# Patient Record
Sex: FEMALE | Race: WHITE | Employment: FULL TIME | URBAN - METROPOLITAN AREA
[De-identification: names, ages, dates, MRNs, and addresses within clinical notes are randomized per-mention and may not be internally consistent; named-entity substitution may affect disease eponyms.]

---

## 2018-01-03 ENCOUNTER — HOSPITAL ENCOUNTER (OUTPATIENT)
Dept: MAMMOGRAPHY | Age: 53
Discharge: HOME | End: 2018-01-03
Attending: FAMILY MEDICINE | Admitting: FAMILY MEDICINE

## 2018-01-03 DIAGNOSIS — Z12.31 ENCOUNTER FOR SCREENING MAMMOGRAM FOR BREAST CANCER: ICD-10-CM

## 2018-09-05 ENCOUNTER — TELEPHONE (OUTPATIENT)
Dept: FAMILY MEDICINE CLINIC | Age: 53
End: 2018-09-05

## 2018-09-18 ENCOUNTER — OFFICE VISIT (OUTPATIENT)
Dept: FAMILY MEDICINE CLINIC | Age: 53
End: 2018-09-18

## 2018-09-18 VITALS
DIASTOLIC BLOOD PRESSURE: 58 MMHG | WEIGHT: 150.2 LBS | HEART RATE: 74 BPM | OXYGEN SATURATION: 98 % | SYSTOLIC BLOOD PRESSURE: 118 MMHG | HEIGHT: 69 IN | BODY MASS INDEX: 22.25 KG/M2

## 2018-09-18 DIAGNOSIS — Z11.59 ENCOUNTER FOR HEPATITIS C SCREENING TEST FOR LOW RISK PATIENT: ICD-10-CM

## 2018-09-18 DIAGNOSIS — Z00.00 ANNUAL PHYSICAL EXAM: Primary | ICD-10-CM

## 2018-09-18 PROCEDURE — 99386 PREV VISIT NEW AGE 40-64: CPT | Performed by: FAMILY MEDICINE

## 2018-09-18 ASSESSMENT — ENCOUNTER SYMPTOMS
SINUS PRESSURE: 0
RHINORRHEA: 0
EYE PAIN: 0
EYE DISCHARGE: 0
CONSTIPATION: 0
SHORTNESS OF BREATH: 0
NAUSEA: 0
DIARRHEA: 0
VOMITING: 0
COLOR CHANGE: 0
COUGH: 0
CHEST TIGHTNESS: 0
EYE REDNESS: 0
ABDOMINAL PAIN: 0
WHEEZING: 0
BLOOD IN STOOL: 0

## 2018-09-18 ASSESSMENT — PATIENT HEALTH QUESTIONNAIRE - PHQ9
SUM OF ALL RESPONSES TO PHQ9 QUESTIONS 1 & 2: 0
SUM OF ALL RESPONSES TO PHQ QUESTIONS 1-9: 0
SUM OF ALL RESPONSES TO PHQ QUESTIONS 1-9: 0
2. FEELING DOWN, DEPRESSED OR HOPELESS: 0
1. LITTLE INTEREST OR PLEASURE IN DOING THINGS: 0

## 2018-12-26 ENCOUNTER — TELEPHONE (OUTPATIENT)
Dept: FAMILY MEDICINE CLINIC | Age: 53
End: 2018-12-26

## 2018-12-26 NOTE — TELEPHONE ENCOUNTER
Left message for patient to call back. We can give her the list of allergists but if they require a referral she still may need an office visit with DR. Dietrich. [address] [facility] [address]                  [place], [address]                   or                  [facility] [address]                  [address]    Dr. Jes Alvarenga    [address], [place] [address] ?                  [facility] [address]? [address]?                    [facility] 30 [phone]

## 2018-12-26 NOTE — TELEPHONE ENCOUNTER
Patient is calling to see who you would recommend for her allergist?  She states she has her normal fall like symptoms:  Burning eyes, runny nose. She also has gords in her basement to dry out and thinks that might be the problem. I advised she may need an appointment for a referral for insurance. She declined an appointment.

## 2019-06-01 ENCOUNTER — HOSPITAL ENCOUNTER (OUTPATIENT)
Dept: WOMENS IMAGING | Age: 54
Discharge: HOME | End: 2019-06-01
Payer: COMMERCIAL

## 2019-06-01 DIAGNOSIS — Z12.39 BREAST CANCER SCREENING: ICD-10-CM

## 2019-06-01 PROCEDURE — 77063 BREAST TOMOSYNTHESIS BI: CPT

## 2019-06-21 ENCOUNTER — OFFICE VISIT (OUTPATIENT)
Dept: FAMILY MEDICINE CLINIC | Age: 54
End: 2019-06-21
Payer: COMMERCIAL

## 2019-06-21 VITALS
TEMPERATURE: 97.5 F | WEIGHT: 153 LBS | BODY MASS INDEX: 22.66 KG/M2 | OXYGEN SATURATION: 99 % | DIASTOLIC BLOOD PRESSURE: 60 MMHG | HEART RATE: 60 BPM | SYSTOLIC BLOOD PRESSURE: 122 MMHG | HEIGHT: 69 IN
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
## 2019-06-21 DIAGNOSIS — R53.83 FATIGUE, UNSPECIFIED TYPE: ICD-10-CM

## 2019-06-21 DIAGNOSIS — M25.50 ARTHRALGIA, UNSPECIFIED JOINT: Primary | ICD-10-CM

## 2019-06-21 DIAGNOSIS — R68.83 CHILLS: ICD-10-CM

## 2019-06-21 LAB
A/G RATIO: 1.7 (ref 1.1–2.2)
ALBUMIN SERPL-MCNC: 4 G/DL (ref 3.4–5)
ALP BLD-CCNC: 56 U/L (ref 40–129)
ALT SERPL-CCNC: 26 U/L (ref 10–40)
ANION GAP SERPL CALCULATED.3IONS-SCNC: 10 MMOL/L (ref 3–16)
AST SERPL-CCNC: 30 U/L (ref 15–37)
BASOPHILS ABSOLUTE: 0 K/UL (ref 0–0.2)
BASOPHILS RELATIVE PERCENT: 0.4 %
BILIRUB SERPL-MCNC: <0.2 MG/DL (ref 0–1)
BUN BLDV-MCNC: 8 MG/DL (ref 7–20)
CALCIUM SERPL-MCNC: 8.6 MG/DL (ref 8.3–10.6)
CHLORIDE BLD-SCNC: 98 MMOL/L (ref 99–110)
CO2: 26 MMOL/L (ref 21–32)
CREAT SERPL-MCNC: 0.8 MG/DL (ref 0.6–1.1)
EOSINOPHILS ABSOLUTE: 0.1 K/UL (ref 0–0.6)
EOSINOPHILS RELATIVE PERCENT: 1.6 %
GFR AFRICAN AMERICAN: >60
GFR NON-AFRICAN AMERICAN: >60
GLOBULIN: 2.3 G/DL
GLUCOSE BLD-MCNC: 90 MG/DL (ref 70–99)
HCT VFR BLD CALC: 37.3 % (ref 36–48)
HEMOGLOBIN: 12.3 G/DL (ref 12–16)
LYMPHOCYTES ABSOLUTE: 1 K/UL (ref 1–5.1)
LYMPHOCYTES RELATIVE PERCENT: 21.9 %
MCH RBC QN AUTO: 30.1 PG (ref 26–34)
MCHC RBC AUTO-ENTMCNC: 33 G/DL (ref 31–36)
MCV RBC AUTO: 91.3 FL (ref 80–100)
MONOCYTES ABSOLUTE: 0.2 K/UL (ref 0–1.3)
MONOCYTES RELATIVE PERCENT: 5.1 %
NEUTROPHILS ABSOLUTE: 3.4 K/UL (ref 1.7–7.7)
NEUTROPHILS RELATIVE PERCENT: 71 %
PDW BLD-RTO: 13.2 % (ref 12.4–15.4)
PLATELET # BLD: 175 K/UL (ref 135–450)
PMV BLD AUTO: 10 FL (ref 5–10.5)
POTASSIUM SERPL-SCNC: 5 MMOL/L (ref 3.5–5.1)
RBC # BLD: 4.09 M/UL (ref 4–5.2)
SEDIMENTATION RATE, ERYTHROCYTE: 16 MM/HR (ref 0–30)
SODIUM BLD-SCNC: 134 MMOL/L (ref 136–145)
TOTAL PROTEIN: 6.3 G/DL (ref 6.4–8.2)
TSH REFLEX: 2.06 UIU/ML (ref 0.27–4.2)
WBC # BLD: 4.8 K/UL (ref 4–11)

## 2019-06-21 PROCEDURE — 99213 OFFICE O/P EST LOW 20 MIN: CPT | Performed by: FAMILY MEDICINE

## 2019-06-21 ASSESSMENT — PATIENT HEALTH QUESTIONNAIRE - PHQ9
SUM OF ALL RESPONSES TO PHQ QUESTIONS 1-9: 0
2. FEELING DOWN, DEPRESSED OR HOPELESS: 0
SUM OF ALL RESPONSES TO PHQ9 QUESTIONS 1 & 2: 0
1. LITTLE INTEREST OR PLEASURE IN DOING THINGS: 0
SUM OF ALL RESPONSES TO PHQ QUESTIONS 1-9: 0

## 2019-06-21 ASSESSMENT — ENCOUNTER SYMPTOMS
VOMITING: 0
NAUSEA: 0
EYE DISCHARGE: 0
WHEEZING: 0
SHORTNESS OF BREATH: 0
SINUS PRESSURE: 0
RHINORRHEA: 0
DIARRHEA: 0
COUGH: 0
BLOOD IN STOOL: 0
EYE PAIN: 0
EYE REDNESS: 0
ABDOMINAL PAIN: 0
CHEST TIGHTNESS: 0
CONSTIPATION: 0

## 2019-06-21 NOTE — PROGRESS NOTES
rebound and no guarding. Lymphadenopathy:     She has no cervical adenopathy. Neurological: She is alert and oriented to person, place, and time. She displays normal reflexes. No cranial nerve deficit. Coordination normal.   Skin: Skin is warm. No rash noted. No erythema. No pallor. No target lesion seen   Psychiatric: She has a normal mood and affect. Her behavior is normal. Thought content normal.       Assessment:      Arthralgia- acute, ?  Viral vs tick related  Chills  Tick bite- possible exposure to lyme      Plan:      Orders Placed This Encounter   Procedures    CBC WITH AUTO DIFFERENTIAL    SEDIMENTATION RATE    BEVERLY    TSH with Reflex    COMPREHENSIVE METABOLIC PANEL    Lyme Disease AB Total W Rflx to IgG/ IgM             JACKIE Lino [address], DO

## 2019-06-22 LAB — ANTI-NUCLEAR ANTIBODY (ANA): NEGATIVE

## 2019-06-22 NOTE — PATIENT INSTRUCTIONS

## 2019-06-24 LAB — LYME, EIA: 0.46 LIV (ref 0–1.2)

## 2021-06-23 ENCOUNTER — OFFICE VISIT (OUTPATIENT)
Dept: ENT CLINIC | Age: 56
End: 2021-06-23
Payer: COMMERCIAL

## 2021-06-23 VITALS
DIASTOLIC BLOOD PRESSURE: 61 MMHG | SYSTOLIC BLOOD PRESSURE: 100 MMHG | HEIGHT: 69 IN | HEART RATE: 68 BPM | TEMPERATURE: 97.2 F | BODY MASS INDEX: 22.66 KG/M2 | WEIGHT: 153 LBS

## 2021-06-23 DIAGNOSIS — H93.13 TINNITUS AURIUM, BILATERAL: Primary | ICD-10-CM

## 2021-06-23 DIAGNOSIS — H61.21 EXCESSIVE CERUMEN IN EAR CANAL, RIGHT: ICD-10-CM

## 2021-06-23 PROCEDURE — 99203 OFFICE O/P NEW LOW 30 MIN: CPT | Performed by: OTOLARYNGOLOGY

## 2021-06-23 NOTE — PROGRESS NOTES
file.     No current facility-administered medications for this visit.        Review of Systems     REVIEW OF SYSTEMS  The following systems were reviewed and revealed the following in addition to any already discussed in the HPI:    CONSTITUTIONAL: No weight loss, no fever, no night sweats, no chills  EYES: no vision changes, no blurry vision  EARS: no changes in hearing, no otalgia  NOSE: no epistaxis, no rhinorrhea  RESPIRATORY: No difficulty breathing, no shortness of breath  CV: no chest pain, no peripheral vascular disease  HEME: No coagulation disorder, no bleeding disorder  NEURO: No TIA or stroke-like symptoms  SKIN: No new rashes in the head and neck, no recent skin cancers  MOUTH: No no new ulcers, no recent teeth infections  GASTROINTESTINAL: No diarrhea, stomach pain  PSYCH: No anxiety, no depression    PhysicalExam     Vitals:    06/23/21 0912   BP: 100/61   Site: Left Upper Arm   Position: Sitting   Pulse: 68   Temp: 97.2 °F (36.2 °C)   Weight: 153 lb (69.4 kg)   Height: 5' 9\" (1.753 m)       PHYSICAL EXAM  /61 (Site: Left Upper Arm, Position: Sitting)   Pulse 68   Temp 97.2 °F (36.2 °C)   Ht 5' 9\" (1.753 m)   Wt 153 lb (69.4 kg)   LMP 10/28/2015 (Approximate)   BMI 22.59 kg/m²     GENERAL: No Acute Distress, Alert and Oriented, no hoarseness  EYES: EOMI, Anti-icteric  NOSE: On anterior rhinoscopy there is no epistaxis, nasal mucosa within normal limits, no purulent drainage  EARS: Normal external appearance; on portable otomicroscopy:  -Right ear: External auditory canal with nonoccluding cerumen, removed with suction, tympanic membrane clear, no middle ear effusions or retractions  -Left ear: External auditory canal without stenosis, tympanic membrane clear, no middle ear effusions or retractions  -Tuning fork testing: With a 512-Hz tuning fork the Lina[place] is midline, The Rinne on the right ear the is air>bone conduction, on the left ear air>bone conduction  FACE: 1/6 House-Brackmann Scale, symmetric, sensation equal bilaterally  ORAL CAVITY: No masses or lesions palpated, uvula is midline, moist mucous membranes, 0+ tonsils, absent uvula, good dentition  NECK: Normal range of motion, no thyromegaly, trachea is midline, no lymphadenopathy, no neck masses, no crepitus  CHEST: Normal respiratory effort, no retractions, breathing comfortably  SKIN: No rashes, normal appearing skin, no evidence of skin lesions/tumors  NEURO: CN 2, 3, 4, 5, 6, 7, 11, 12 intact bilaterally     Data/Imaging Review       Procedure     None      Assessment and Plan     1. Tinnitus aurium, bilateral  Bilateral tinnitus for the past 3-4 years with no subjective hearing loss; tonal, constant, nonpulsatile in quality. Nonintrusive. We discussed methods for tinnitus management including stress relief, masking; we offered audiometric testing which the patient declined at this time-should hearing worsen, or tinnitus become intrusive we would recommend an audiogram.  She may follow-up with us on an as-needed basis. 2. Excessive cerumen in ear canal, right  Patient is a Q-tip user-she has significant but not impacted cerumen in the right ear which was removed with suction. We counseled her on the use of hydroperoxide/mineral oil for management of cerumen, advised her not to use Q-tips in the ears. We will see her back for this on an as-needed basis. Return if symptoms worsen or fail to improve. Nile Llamas MD  [address] [place], [place] [address]  Department of Otolaryngology/Head and Neck Surgery  6/23/21    I have performed a head and neck physical exam personally or was physically present during the key or critical portions of the service. Medical Decision Making:   The following items were considered in medical decision making:  Independent review of images  Review / order clinical lab tests  Review / order radiology tests  Decision to obtain old records  Review and summation of old records as accessed through [facility] (a summary of my findings in these old records: None)     Portions of this note were dictated using Dragon.  There may be linguistic errors secondary to the use of this program.

## 2021-06-23 NOTE — PATIENT INSTRUCTIONS
Ear hygiene instructions:  -Do not use q-tips or helen pins to clean out ears  -Do not place fingers in ear canals  -If ears feel occluded by wax, may use hydrogen peroxide (10 drops in each ear every 2 weeks, lie with ear upright for 10-15 minutes after placing hydrogen peroxide drops) or mineral oil (4 drops every 2 weeks) to clean ear canals

## 2021-08-03 ENCOUNTER — HOSPITAL ENCOUNTER (OUTPATIENT)
Dept: WOMENS IMAGING | Age: 56
Discharge: HOME | End: 2021-08-03
Payer: COMMERCIAL

## 2021-08-03 VITALS — WEIGHT: 150 LBS | HEIGHT: 69 IN | BODY MASS INDEX: 22.22 KG/M2

## 2021-08-03 DIAGNOSIS — Z12.31 ENCOUNTER FOR SCREENING MAMMOGRAM FOR BREAST CANCER: ICD-10-CM

## 2021-08-03 PROCEDURE — 77063 BREAST TOMOSYNTHESIS BI: CPT

## 2021-08-12 ENCOUNTER — OFFICE VISIT (OUTPATIENT)
Dept: FAMILY MEDICINE CLINIC | Age: 56
End: 2021-08-12
Payer: COMMERCIAL

## 2021-08-12 ENCOUNTER — TELEPHONE (OUTPATIENT)
Dept: PULMONOLOGY | Age: 56
End: 2021-08-12

## 2021-08-12 VITALS
SYSTOLIC BLOOD PRESSURE: 118 MMHG | HEIGHT: 69 IN | HEART RATE: 67 BPM | OXYGEN SATURATION: 98 % | WEIGHT: 150.8 LBS | DIASTOLIC BLOOD PRESSURE: 72 MMHG | BODY MASS INDEX: 22.33 KG/M2

## 2021-08-12 DIAGNOSIS — Z00.00 ANNUAL PHYSICAL EXAM: Primary | ICD-10-CM

## 2021-08-12 DIAGNOSIS — R06.83 SNORING: ICD-10-CM

## 2021-08-12 DIAGNOSIS — Z11.4 ENCOUNTER FOR SCREENING FOR HUMAN IMMUNODEFICIENCY VIRUS (HIV): ICD-10-CM

## 2021-08-12 DIAGNOSIS — Z23 NEED FOR SHINGLES VACCINE: ICD-10-CM

## 2021-08-12 DIAGNOSIS — R53.82 CHRONIC FATIGUE: ICD-10-CM

## 2021-08-12 DIAGNOSIS — Z11.59 ENCOUNTER FOR HEPATITIS C SCREENING TEST FOR LOW RISK PATIENT: ICD-10-CM

## 2021-08-12 LAB
A/G RATIO: 1.8 (ref 1.1–2.2)
ALBUMIN SERPL-MCNC: 4.4 G/DL (ref 3.4–5)
ALP BLD-CCNC: 50 U/L (ref 40–129)
ALT SERPL-CCNC: 22 U/L (ref 10–40)
ANION GAP SERPL CALCULATED.3IONS-SCNC: 12 MMOL/L (ref 3–16)
AST SERPL-CCNC: 27 U/L (ref 15–37)
BILIRUB SERPL-MCNC: <0.2 MG/DL (ref 0–1)
BUN BLDV-MCNC: 15 MG/DL (ref 7–20)
CALCIUM SERPL-MCNC: 8.9 MG/DL (ref 8.3–10.6)
CHLORIDE BLD-SCNC: 102 MMOL/L (ref 99–110)
CHOLESTEROL, TOTAL: 224 MG/DL (ref 0–199)
CO2: 27 MMOL/L (ref 21–32)
CREAT SERPL-MCNC: 0.6 MG/DL (ref 0.6–1.1)
GFR AFRICAN AMERICAN: >60
GFR NON-AFRICAN AMERICAN: >60
GLOBULIN: 2.4 G/DL
GLUCOSE BLD-MCNC: 88 MG/DL (ref 70–99)
HCT VFR BLD CALC: 37.1 % (ref 36–48)
HDLC SERPL-MCNC: 75 MG/DL (ref 40–60)
HEMOGLOBIN: 12.4 G/DL (ref 12–16)
HEPATITIS C ANTIBODY INTERPRETATION: NORMAL
LDL CHOLESTEROL CALCULATED: 134 MG/DL
MCH RBC QN AUTO: 30.3 PG (ref 26–34)
MCHC RBC AUTO-ENTMCNC: 33.3 G/DL (ref 31–36)
MCV RBC AUTO: 90.9 FL (ref 80–100)
PDW BLD-RTO: 13.9 % (ref 12.4–15.4)
PLATELET # BLD: 251 K/UL (ref 135–450)
PMV BLD AUTO: 10.1 FL (ref 5–10.5)
POTASSIUM SERPL-SCNC: 4.5 MMOL/L (ref 3.5–5.1)
RBC # BLD: 4.08 M/UL (ref 4–5.2)
SODIUM BLD-SCNC: 141 MMOL/L (ref 136–145)
TOTAL PROTEIN: 6.8 G/DL (ref 6.4–8.2)
TRIGL SERPL-MCNC: 76 MG/DL (ref 0–150)
TSH REFLEX: 1.08 UIU/ML (ref 0.27–4.2)
VLDLC SERPL CALC-MCNC: 15 MG/DL
WBC # BLD: 5.8 K/UL (ref 4–11)

## 2021-08-12 PROCEDURE — 36415 COLL VENOUS BLD VENIPUNCTURE: CPT | Performed by: FAMILY MEDICINE

## 2021-08-12 PROCEDURE — 90750 HZV VACC RECOMBINANT IM: CPT | Performed by: FAMILY MEDICINE

## 2021-08-12 PROCEDURE — 90471 IMMUNIZATION ADMIN: CPT | Performed by: FAMILY MEDICINE

## 2021-08-12 PROCEDURE — 99396 PREV VISIT EST AGE 40-64: CPT | Performed by: FAMILY MEDICINE

## 2021-08-12 ASSESSMENT — ENCOUNTER SYMPTOMS
EYE PAIN: 0
BLOOD IN STOOL: 0
COUGH: 0
EYE DISCHARGE: 0
EYE REDNESS: 0
CONSTIPATION: 0
COLOR CHANGE: 0
NAUSEA: 0
DIARRHEA: 0
WHEEZING: 0
CHEST TIGHTNESS: 0
SINUS PRESSURE: 0
RHINORRHEA: 0
ABDOMINAL PAIN: 0
SHORTNESS OF BREATH: 0
VOMITING: 0

## 2021-08-12 ASSESSMENT — PATIENT HEALTH QUESTIONNAIRE - PHQ9
SUM OF ALL RESPONSES TO PHQ QUESTIONS 1-9: 0
2. FEELING DOWN, DEPRESSED OR HOPELESS: 0
SUM OF ALL RESPONSES TO PHQ9 QUESTIONS 1 & 2: 0
1. LITTLE INTEREST OR PLEASURE IN DOING THINGS: 0

## 2021-08-12 NOTE — PROGRESS NOTES
Subjective:      Patient ID: Yolanda Hearn is a 54 y.o. female. HPI  Chief Complaint   Patient presents with    Annual Exam     Pt is here for physical. Pt states she did have 3 glasses of wine last night. Here for CPE. Nonsmoker. [facility] dental and vision  Sees Dr. Montgomery- annual visit  Has chronic fatigue even though very active  States does snore at nite   on CPAP    Yolanda Hearn is a 54 y.o. female with the following history as recorded in [facility]:  Patient Active Problem List    Diagnosis Date Noted    Menopause 2015    Radiculopathy affecting upper extremity 2014    Left arm numbness 2014    Neck pain 2014    Melanoma ([facility] [address]      No current outpatient medications on file. No current facility-administered medications for this visit. Allergies: Patient has no known allergies. Past Medical History:   Diagnosis Date    Melanoma ([facility] [address]     twice, 10 years ago    Radiculopathy affecting upper extremity 2014     Past Surgical History:   Procedure Laterality Date    COLONOSCOPY  16    PLANTAR FASCIA SURGERY  2014    UTERINE FIBROID SURGERY       Family History   Problem Relation Age of Onset    Parkinsonism Father     Cancer Maternal Grandmother 54        breast    Breast Cancer Maternal Grandmother     Cancer Paternal Grandmother 79        breast    Breast Cancer Paternal Grandmother      Social History     Tobacco Use    Smoking status: Former Smoker     Packs/day: 0.25     Years: 5.00     Pack years: 1.25     Types: Cigarettes     Quit date: 1988     Years since quittin.9    Smokeless tobacco: Never Used   Substance Use Topics    Alcohol use:  Yes     Alcohol/week: 1.0 standard drinks     Types: 1 Glasses of wine per week     Comment: 3-4 days per week     Vitals:    21 0829   BP: 118/72   Site: Right Upper Arm   Position: Sitting   Pulse: 67   SpO2: 98%   Weight: 150 lb 12.8 oz (68.4 kg)   Height: 5' 9\" (1.753 m)     Body mass index is 22.27 kg/m². Wt Readings from Last 3 Encounters:   08/12/21 150 lb 12.8 oz (68.4 kg)   08/03/21 150 lb (68 kg)   06/23/21 153 lb (69.4 kg)     BP Readings from Last 3 Encounters:   08/12/21 118/72   06/23/21 100/61   06/21/19 122/60        Review of Systems   Constitutional: Negative for chills, fatigue, fever and unexpected weight change. HENT: Negative for ear discharge, ear pain, hearing loss, rhinorrhea, sinus pressure and tinnitus. Eyes: Negative for pain, discharge, redness and visual disturbance. Respiratory: Negative for cough, chest tightness, shortness of breath and wheezing. Cardiovascular: Negative for chest pain and palpitations. Gastrointestinal: Negative for abdominal pain, blood in stool, constipation, diarrhea, nausea and vomiting. Genitourinary: Negative for difficulty urinating, dysuria and hematuria. Musculoskeletal: Negative for arthralgias and myalgias. Skin: Negative for color change and rash. Neurological: Negative for dizziness, seizures, syncope and headaches. Hematological: Negative for adenopathy. Does not bruise/bleed easily. Psychiatric/Behavioral: Negative for dysphoric mood and sleep disturbance. The patient is not nervous/anxious. Objective:   Physical Exam  Constitutional:       General: She is not in acute distress. Appearance: Normal appearance. She is well-developed and normal weight. She is not ill-appearing. HENT:      Head: Normocephalic. Right Ear: Tympanic membrane and external ear normal.      Left Ear: Tympanic membrane and external ear normal.      Nose: Nose normal.      Mouth/Throat:      Mouth: Mucous membranes are moist.      Pharynx: Oropharynx is clear. No oropharyngeal exudate. Eyes:      General: No scleral icterus. Conjunctiva/sclera: Conjunctivae normal.      Pupils: Pupils are equal, round, and reactive to light. Neck:      Thyroid: No thyromegaly.    Cardiovascular:      Rate and Rhythm: Normal rate and regular rhythm. Heart sounds: Normal heart sounds. No murmur heard. Pulmonary:      Effort: Pulmonary effort is normal.      Breath sounds: Normal breath sounds. No wheezing. Abdominal:      General: Bowel sounds are normal. There is no distension. Palpations: Abdomen is soft. Tenderness: There is no abdominal tenderness. There is no guarding or rebound. Musculoskeletal:         General: No tenderness. Normal range of motion. Cervical back: Neck supple. Lymphadenopathy:      Cervical: No cervical adenopathy. Skin:     General: Skin is warm and dry. Coloration: Skin is not jaundiced. Findings: No bruising, erythema or rash. Neurological:      General: No focal deficit present. Mental Status: She is alert and oriented to person, place, and time. Cranial Nerves: No cranial nerve deficit. Coordination: Coordination normal.   Psychiatric:         Mood and Affect: Mood normal.         Behavior: Behavior normal.         Thought Content: Thought content normal.         Judgment: Judgment normal.         Assessment:      CPE      Plan:      Patient Counseling:  --Nutrition: Stressed importance of moderation in sodium/caffeine intake, saturated fat and cholesterol, caloric balance, sufficient intake of fresh fruits, vegetables, fiber, calcium, iron, and 1 mg of folate supplement per day (for females capable of pregnancy). --Exercise: Stressed the importance of regular exercise. --Dental health: Discussed importance of regular tooth brushing, flossing, and dental visits. --Immunizations reviewed. Daily sunscreen recommended. Yearly FSE discussed  --Discussed benefits of screening colonoscopy.     Orders Placed This Encounter   Procedures    [facility])    LIPID PANEL     Order Specific Question:   Is Patient Fasting?/# of Hours     Answer:   12    COMPREHENSIVE METABOLIC PANEL    HEPATITIS C ANTIBODY    HIV-1 AND HIV-2 ANTIBODIES    TSH with Reflex    CBC   Nithin Ramirez MD, Pulmonary, [place]     Referral Priority:   Routine     Referral Type:   Eval and Treat     Referral Reason:   Specialty Services Required     Referred to Provider:   Maco Brenner MD     Requested Specialty:   Pulmonary Disease     Number of Visits Requested:   1             JACKIE Lino [address],

## 2021-08-13 LAB
HIV AG/AB: NORMAL
HIV ANTIGEN: NORMAL
HIV-1 ANTIBODY: NORMAL
HIV-2 AB: NORMAL

## 2021-10-04 ENCOUNTER — TELEPHONE (OUTPATIENT)
Dept: PULMONOLOGY | Age: 56
End: 2021-10-04

## 2021-10-04 NOTE — TELEPHONE ENCOUNTER
Patient cancelled appointment on 10/5/21 with Dr. Jessica Cummings for NPT sleep. Reason: Conflict with another appt    Patient did reschedule appointment. Appointment rescheduled for 11/24/21.

## 2021-11-01 ENCOUNTER — NURSE ONLY (OUTPATIENT)
Dept: FAMILY MEDICINE CLINIC | Age: 56
End: 2021-11-01
Payer: COMMERCIAL

## 2021-11-01 DIAGNOSIS — Z23 NEED FOR SHINGLES VACCINE: Primary | ICD-10-CM

## 2021-11-01 PROCEDURE — 90750 HZV VACC RECOMBINANT IM: CPT | Performed by: FAMILY MEDICINE

## 2021-11-01 PROCEDURE — 90471 IMMUNIZATION ADMIN: CPT | Performed by: FAMILY MEDICINE

## 2022-01-19 ENCOUNTER — OFFICE VISIT (OUTPATIENT)
Dept: OBGYN CLINIC | Age: 57
End: 2022-01-19
Payer: COMMERCIAL

## 2022-01-19 VITALS
TEMPERATURE: 97.6 F | HEART RATE: 78 BPM | WEIGHT: 154 LBS | BODY MASS INDEX: 22.74 KG/M2 | DIASTOLIC BLOOD PRESSURE: 70 MMHG | SYSTOLIC BLOOD PRESSURE: 114 MMHG

## 2022-01-19 DIAGNOSIS — N95.2 VAGINAL ATROPHY: ICD-10-CM

## 2022-01-19 DIAGNOSIS — Z12.4 PAP SMEAR FOR CERVICAL CANCER SCREENING: ICD-10-CM

## 2022-01-19 DIAGNOSIS — Z78.0 MENOPAUSE: ICD-10-CM

## 2022-01-19 DIAGNOSIS — Z01.419 WOMEN'S ANNUAL ROUTINE GYNECOLOGICAL EXAMINATION: Primary | ICD-10-CM

## 2022-01-19 PROCEDURE — 99386 PREV VISIT NEW AGE 40-64: CPT | Performed by: OBSTETRICS & GYNECOLOGY

## 2022-01-22 LAB
HPV COMMENT: NORMAL
HPV TYPE 16: NOT DETECTED
HPV TYPE 18: NOT DETECTED
HPVOH (OTHER TYPES): NOT DETECTED

## 2022-01-23 PROBLEM — N95.2 VAGINAL ATROPHY: Status: ACTIVE | Noted: 2022-01-23

## 2022-01-23 ASSESSMENT — ENCOUNTER SYMPTOMS
ABDOMINAL DISTENTION: 0
DIARRHEA: 0
VOMITING: 0
ABDOMINAL PAIN: 0
SHORTNESS OF BREATH: 0
CONSTIPATION: 0
NAUSEA: 0

## 2022-01-23 NOTE — PROGRESS NOTES
Subjective:      Patient ID: Popeye Talamantes is a 64 y.o. female. HPI  63 y/o  female presents for well woman examination. Last pap smear was 12/16/15--normal, negative testing for high risk HPV. No history of abnormal pap smear. Achieved menopause age 50. Has noted recent spotting after coitus. Patient recently resumed sexual activity-- had been dealing with stress at work-placed on anti-depressant.  retired in December and discontinued medication. Postcoital blood is light, only with wiping after urination. Has not noted blood recently. Denies vaginal discharge. History significant for uterine fibroid surgery? .  Patient is unclear on details surrounding procedure/surgery. Had colonoscopy 6 years ago. Repeat planned at 10 years. Family history is positive for breast cancer in maternal and paternal grandmother. Review of Systems   Constitutional: Negative. Negative for activity change, appetite change, chills, fatigue, fever and unexpected weight change. Respiratory: Negative for shortness of breath. Cardiovascular: Negative for chest pain. Gastrointestinal: Negative for abdominal distention, abdominal pain, constipation, diarrhea, nausea and vomiting. Endocrine: Negative for cold intolerance and heat intolerance. Genitourinary: Positive for vaginal bleeding. Negative for difficulty urinating, dyspareunia, dysuria, frequency, genital sores, hematuria, menstrual problem, pelvic pain, vaginal discharge and vaginal pain. Skin: Negative for rash. Neurological: Negative for headaches. Hematological: Does not bruise/bleed easily. Psychiatric/Behavioral: Negative. Objective:   Physical Exam  Vitals and nursing note reviewed. Exam conducted with a chaperone present. Constitutional:       General: She is not in acute distress. Appearance: Normal appearance. She is well-developed. She is not ill-appearing or toxic-appearing.    HENT:      Head: Normocephalic and atraumatic. Neck:      Thyroid: No thyromegaly. Trachea: Trachea normal.   Cardiovascular:      Rate and Rhythm: Normal rate and regular rhythm. Heart sounds: Normal heart sounds, S1 normal and S2 normal.   Pulmonary:      Effort: Pulmonary effort is normal. No respiratory distress. Breath sounds: Normal breath sounds. Chest:   Breasts: Breasts are symmetrical.      Right: No inverted nipple, mass, nipple discharge, skin change or tenderness. Left: No inverted nipple, mass, nipple discharge, skin change or tenderness. Abdominal:      General: Bowel sounds are normal. There is no distension. Palpations: Abdomen is soft. Abdomen is not rigid. There is no mass. Tenderness: There is no abdominal tenderness. There is no guarding or rebound. Hernia: There is no hernia in the left inguinal area. Genitourinary:     General: Normal vulva. Exam position: Lithotomy position. Labia:         Right: No rash, tenderness, lesion or injury. Left: No rash, tenderness, lesion or injury. Vagina: No signs of injury. No vaginal discharge, erythema, tenderness or bleeding. Cervix: No cervical motion tenderness, discharge or friability. Uterus: Not tender. Adnexa:         Right: No mass, tenderness or fullness. Left: No mass, tenderness or fullness. Rectum: Normal. Guaiac result negative. Comments: Left labial abrasion noted--mild erythema. No apparent signs of infection. Vaginal atrophy noted. Musculoskeletal:         General: Normal range of motion. Cervical back: Neck supple. Skin:     General: Skin is warm and dry. Findings: No erythema or rash. Nails: There is no clubbing. Neurological:      Mental Status: She is alert and oriented to person, place, and time. Psychiatric:         Speech: Speech normal.         Behavior: Behavior normal. Behavior is cooperative.          Thought Content: Thought content normal.         Judgment: Judgment normal.         Assessment:       Diagnosis Orders   1. Women's annual routine gynecological examination  PAP SMEAR   2. Pap smear for cervical cancer screening  PAP SMEAR   3. Menopause     4. Vaginal atrophy             Plan:      Orders Placed This Encounter   Procedures    PAP SMEAR    Human papillomavirus (HPV) DNA probe thin prep high risk    PAP SMEAR     Measures for treatment of vaginal atrophy reviewed--lubrication, foreplay, etc.   Consider vaginal estrogen. Patient to follow up if any further bleeding:  Ultrasound and endometrial biopsy.    Follow up prn and 1 year         Kylee Espinoza DO

## 2022-02-28 ENCOUNTER — OFFICE VISIT (OUTPATIENT)
Dept: PULMONOLOGY | Age: 57
End: 2022-02-28
Payer: COMMERCIAL

## 2022-02-28 VITALS
SYSTOLIC BLOOD PRESSURE: 113 MMHG | HEIGHT: 69 IN | HEART RATE: 73 BPM | RESPIRATION RATE: 16 BRPM | OXYGEN SATURATION: 99 % | TEMPERATURE: 96.5 F | WEIGHT: 157.6 LBS | BODY MASS INDEX: 23.34 KG/M2 | DIASTOLIC BLOOD PRESSURE: 72 MMHG

## 2022-02-28 DIAGNOSIS — G47.30 OBSERVED SLEEP APNEA: ICD-10-CM

## 2022-02-28 PROCEDURE — 99202 OFFICE O/P NEW SF 15 MIN: CPT | Performed by: INTERNAL MEDICINE

## 2022-02-28 ASSESSMENT — SLEEP AND FATIGUE QUESTIONNAIRES
HOW LIKELY ARE YOU TO NOD OFF OR FALL ASLEEP WHILE WATCHING TV: 3
NECK CIRCUMFERENCE (INCHES): 14
ESS TOTAL SCORE: 12
HOW LIKELY ARE YOU TO NOD OFF OR FALL ASLEEP WHILE SITTING AND READING: 3
HOW LIKELY ARE YOU TO NOD OFF OR FALL ASLEEP WHILE LYING DOWN TO REST IN THE AFTERNOON WHEN CIRCUMSTANCES PERMIT: 3
HOW LIKELY ARE YOU TO NOD OFF OR FALL ASLEEP WHILE SITTING AND TALKING TO SOMEONE: 0
HOW LIKELY ARE YOU TO NOD OFF OR FALL ASLEEP WHILE SITTING INACTIVE IN A PUBLIC PLACE: 0
HOW LIKELY ARE YOU TO NOD OFF OR FALL ASLEEP WHEN YOU ARE A PASSENGER IN A CAR FOR AN HOUR WITHOUT A BREAK: 0
HOW LIKELY ARE YOU TO NOD OFF OR FALL ASLEEP IN A CAR, WHILE STOPPED FOR A FEW MINUTES IN TRAFFIC: 0
HOW LIKELY ARE YOU TO NOD OFF OR FALL ASLEEP WHILE SITTING QUIETLY AFTER LUNCH WITHOUT ALCOHOL: 3

## 2022-02-28 NOTE — PATIENT INSTRUCTIONS
Remember to bring a list of pulmonary medications and any CPAP or BiPAP machines to your next appointment with the office. Please keep all of your future appointments scheduled by [facility] Pulmonary office. Out of respect for other patients and providers, you may be asked to reschedule your appointment if you arrive later than your scheduled appointment time. Appointments cancelled less than 24hrs in advance will be considered a no show. Patients with three missed appointments within 1 year or four missed appointments within 2 years can be dismissed from the practice. Please be aware that our physicians are required to work in the Intensive Care Unit at [facility].  Your appointment may need to be rescheduled if they are designated to work during your appointment time. You may receive a survey regarding the care you received during your visit. Your input is valuable to us. We encourage you to complete and return your survey. We hope you will choose us in the future for your healthcare needs. Pt instructed of all future appointment dates & times, including radiology, labs, procedures & referrals. If procedures were scheduled preparation instructions provided. Instructions on future appointments with [facility] Pulmonary were given. Sleep center will call to schedule you sleep study.  If you have any question their phone  Number is [phone]

## 2022-02-28 NOTE — PROGRESS NOTES
Chief Complaint/Referring Provider:  Patient is being seen at the request of Dr. Jerica Asher for a consultation for sleep apnea     Presenting HPI: Patient is a 26-year-old female who has come to the office for a pulmonary evaluation  Patient states that her  complains that she snores and also she stops breathing at nighttime along with that patient also has been having increased tiredness and sleepiness especially in the afternoon, patient states that she wakes up in the middle of the night multiple times, patient feels having no energy in the daytime, patient states that she sleeps with the mouth open and she drools, patient occasionally has dry mouth, patient has electric based heating patient does not have any significant odynophagia or dysphagia, no increasing cough expectoration shortness of breath or wheezing, patient states that as far as she knows her thyroid function is normal, patient does not have any significant fever chills or any pleuritic chest pain, no palpitations or diaphoresis, no abdominal symptoms of concern, no increasing leg edema, patient does not take any medication on regular basis, patient has had melanoma twice but she has been free of melanoma for last 2 years, patient's weight fluctuates between 148 to 151 pounds at home, patient does not have any confusion lethargy, no other pertinent review of system of concern    Past Medical History:   Diagnosis Date    Melanoma ([facility] [address]     twice, 10 years ago    Radiculopathy affecting upper extremity 2/18/2014       Past Surgical History:   Procedure Laterality Date    COLONOSCOPY  2/1/16    PLANTAR FASCIA SURGERY  5/2014    UTERINE FIBROID SURGERY  2009       No Known Allergies    Medication list was reviewed and updated as needed in Epic    Social History     Socioeconomic History    Marital status:      Spouse name: Not on file    Number of children: Not on file    Years of education: Not on file    Highest education level: Not on file   Occupational History    Occupation: retired   Tobacco Use    Smoking status: Former Smoker     Packs/day: 0.25     Years: 10.00     Pack years: 2.50     Types: Cigarettes     Quit date: 1988     Years since quittin.4    Smokeless tobacco: Never Used   Vaping Use    Vaping Use: Never used   Substance and Sexual Activity    Alcohol use: Yes     Alcohol/week: 1.0 standard drink     Types: 1 Glasses of wine per week     Comment: 3-4 days per week    Drug use: No    Sexual activity: Yes     Partners: Male     Birth control/protection: Post-menopausal   Other Topics Concern    Not on file   Social History Narrative    Not on file     Social Determinants of Health     Financial Resource Strain:     Difficulty of Paying Living Expenses: Not on file   Food Insecurity:     Worried About Running Out of Food in the Last Year: Not on file    Ирина of Food in the Last Year: Not on file   Transportation Needs:     Lack of Transportation (Medical): Not on file    Lack of Transportation (Non-Medical):  Not on file   Physical Activity:     Days of Exercise per Week: Not on file    Minutes of Exercise per Session: Not on file   Stress:     Feeling of Stress : Not on file   Social Connections:     Frequency of Communication with Friends and Family: Not on file    Frequency of Social Gatherings with Friends and Family: Not on file    Attends Shinto Services: Not on file    Active Member of [address] [place] [address] or Organizations: Not on file    Attends Club or Organization Meetings: Not on file    Marital Status: Not on file   Intimate Partner Violence:     Fear of Current or Ex-Partner: Not on file    Emotionally Abused: Not on file    Physically Abused: Not on file    Sexually Abused: Not on file   Housing Stability:     Unable to Pay for Housing in the Last Year: Not on file    Number of [place] in the Last Year: Not on file    Unstable Housing in the Last Year: Not on file       Family History   Problem Relation Age of Onset    Parkinsonism Father     Cancer Maternal Grandmother 54        breast    Breast Cancer Maternal Grandmother     Cancer Paternal Grandmother 79        breast    Breast Cancer Paternal Grandmother             Review of Systems same as above    Physical Exam:  Blood pressure 113/72, pulse 73, temperature 96.5 °F (35.8 °C), temperature source Temporal, resp. rate 16, height 5' 9\" (1.753 m), weight 157 lb 9.6 oz (71.5 kg), last menstrual period 10/28/2015, SpO2 99 %, not currently breastfeeding.'  Constitutional:  No acute distress. HENT:  Oropharynx is clear and moist. No thyromegaly. Eyes:  Conjunctivae arenormal. Pupils equal, round, and reactive to light. No scleral icterus. Neck: . No tracheal deviation present. No obvious thyroid mass. Cardiovascular:Normal rate, regular rhythm, normal heart sounds. No right ventricular heave. Nolower extremity edema. Pulmonary/Chest: No wheezes. No rales. Chest wall is not dull to percussion. Noaccessory muscle usage or stridor. Abdominal: Soft. Bowel sounds present. No distension or hernia. Notenderness. Musculoskeletal: No cyanosis. No clubbing. No obvious joint deformity. Lymphadenopathy: No cervical or supraclavicular adenopathy. Skin: Skin is warm and dry. No rash or nodules on the exposed extremities. Psychiatric: Normal mood and affect. Behavior is normal.  No anxiety. Neurologic: Alert, awake and oriented. PERRL. Speech fluent      Sleep Medicine Data:  Sitting and reading: High chance of dozing  Watching TV: High chance of dozing  Sitting, inactive in a public place (e.g. a theatre or a meeting):  Would never doze  As a passenger in a car for an hour without a break: Would never doze  Lying down to rest in the afternoon when circumstances permit: High chance of dozing  Sitting and talking to someone: Would never doze  Sitting quietly after a lunch without alcohol: High chance of dozing  In a car, while stopped for a few minutes in traffic: Would never doze  Total score: 12  Neck circumference (Inches): 14    Data:     Imaging:  I have reviewed radiology images personally. No orders to display           Assessment:    1. Observed sleep apnea    - Home Sleep Study;  Future        Plan:   · Patient's review of system were discussed  · Patient was told about the clinical finding including auscultation and implications  · Patient was told about the pathophysiology and sequelae of JUDIE  · Patient needs to have a sleep study at home to assess for JUDIE and after discussion it was ordered  · Patient was told about diet and muscle modifications  · Further management depending on patient clinical status and the test results

## 2022-02-28 NOTE — PROGRESS NOTES
MA Communication: The following orders are received by verbal communication from Dawit Pruitt MD    Orders include:    Pt f/u 2 mon    Sleep center will call to schedule you sleep study.  If you have any question their phone  Number is [phone]

## 2022-03-22 ENCOUNTER — HOSPITAL ENCOUNTER (OUTPATIENT)
Dept: SLEEP CENTER | Age: 57
Discharge: HOME | End: 2022-03-24
Payer: COMMERCIAL

## 2022-03-22 DIAGNOSIS — G47.30 OBSERVED SLEEP APNEA: ICD-10-CM

## 2022-03-22 PROCEDURE — 95806 SLEEP STUDY UNATT&RESP EFFT: CPT

## 2022-03-23 PROCEDURE — 95806 SLEEP STUDY UNATT&RESP EFFT: CPT | Performed by: INTERNAL MEDICINE

## 2022-05-26 ENCOUNTER — TELEPHONE (OUTPATIENT)
Dept: PULMONOLOGY | Age: 57
End: 2022-05-26

## 2022-05-26 ENCOUNTER — TELEMEDICINE (OUTPATIENT)
Dept: PULMONOLOGY | Age: 57
End: 2022-05-26
Payer: COMMERCIAL

## 2022-05-26 DIAGNOSIS — G47.33 OSA (OBSTRUCTIVE SLEEP APNEA): Primary | ICD-10-CM

## 2022-05-26 PROCEDURE — 99213 OFFICE O/P EST LOW 20 MIN: CPT | Performed by: INTERNAL MEDICINE

## 2022-05-26 ASSESSMENT — SLEEP AND FATIGUE QUESTIONNAIRES
HOW LIKELY ARE YOU TO NOD OFF OR FALL ASLEEP WHEN YOU ARE A PASSENGER IN A CAR FOR AN HOUR WITHOUT A BREAK: 1
HOW LIKELY ARE YOU TO NOD OFF OR FALL ASLEEP WHILE WATCHING TV: 0
ESS TOTAL SCORE: 3
HOW LIKELY ARE YOU TO NOD OFF OR FALL ASLEEP WHILE LYING DOWN TO REST IN THE AFTERNOON WHEN CIRCUMSTANCES PERMIT: 2
HOW LIKELY ARE YOU TO NOD OFF OR FALL ASLEEP WHILE SITTING AND READING: 0
HOW LIKELY ARE YOU TO NOD OFF OR FALL ASLEEP IN A CAR, WHILE STOPPED FOR A FEW MINUTES IN TRAFFIC: 0
HOW LIKELY ARE YOU TO NOD OFF OR FALL ASLEEP WHILE SITTING QUIETLY AFTER LUNCH WITHOUT ALCOHOL: 0
HOW LIKELY ARE YOU TO NOD OFF OR FALL ASLEEP WHILE SITTING AND TALKING TO SOMEONE: 0
HOW LIKELY ARE YOU TO NOD OFF OR FALL ASLEEP WHILE SITTING INACTIVE IN A PUBLIC PLACE: 0

## 2022-05-26 NOTE — PROGRESS NOTES
MA Communication:   The following orders are received by verbal communication from Delmis Lira MD    Orders include:    31-90 day: 08/30/2022 1:00 pm

## 2022-05-26 NOTE — PROGRESS NOTES
Chief Complaint/Referring Provider:  Pulmonary follow up and to discuss the test results    Virtual pulmonary visit was done for the patient to discuss her clinical status and the test results, patient was subject to home sleep study, patient states that she does not have any significant cough or expectoration or any nasal congestion at this time, no increasing shortness of breath or wheezing, no palpitations or diaphoresis, no abdominal symptoms of concern, no increasing leg edema, no change in the ambient murmur any medications, patient does not have any other pertinent review of system of concern       Previous  HPI: Patient is a 59-year-old female who has come to the office for a pulmonary evaluation  Patient states that her  complains that she snores and also she stops breathing at nighttime along with that patient also has been having increased tiredness and sleepiness especially in the afternoon, patient states that she wakes up in the middle of the night multiple times, patient feels having no energy in the daytime, patient states that she sleeps with the mouth open and she drools, patient occasionally has dry mouth, patient has electric based heating patient does not have any significant odynophagia or dysphagia, no increasing cough expectoration shortness of breath or wheezing, patient states that as far as she knows her thyroid function is normal, patient does not have any significant fever chills or any pleuritic chest pain, no palpitations or diaphoresis, no abdominal symptoms of concern, no increasing leg edema, patient does not take any medication on regular basis, patient has had melanoma twice but she has been free of melanoma for last 2 years, patient's weight fluctuates between 148 to 151 pounds at home, patient does not have any confusion lethargy, no other pertinent review of system of concern    Past Medical History:   Diagnosis Date    Melanoma ([facility] [address]     twice, 10 years ago   Felice Camara Radiculopathy affecting upper extremity 2014       Past Surgical History:   Procedure Laterality Date    COLONOSCOPY  16    MOUTH SURGERY  2022    gum    PLANTAR FASCIA SURGERY  2014    UTERINE FIBROID SURGERY         No Known Allergies    Medication list was reviewed and updated as needed in [facility]    Social History     Socioeconomic History    Marital status:      Spouse name: Not on file    Number of children: Not on file    Years of education: Not on file    Highest education level: Not on file   Occupational History    Occupation: retired   Tobacco Use    Smoking status: Former Smoker     Packs/day: 0.25     Years: 10.00     Pack years: 2.50     Types: Cigarettes     Quit date: 1988     Years since quittin.7    Smokeless tobacco: Never Used   Vaping Use    Vaping Use: Never used   Substance and Sexual Activity    Alcohol use: Yes     Alcohol/week: 1.0 standard drink     Types: 1 Glasses of wine per week     Comment: 3-4 days per week    Drug use: No    Sexual activity: Yes     Partners: Male     Birth control/protection: Post-menopausal   Other Topics Concern    Not on file   Social History Narrative    Not on file     Social Determinants of Health     Financial Resource Strain:     Difficulty of Paying Living Expenses: Not on file   Food Insecurity:     Worried About Running Out of Food in the Last Year: Not on file    Ирина of Food in the Last Year: Not on file   Transportation Needs:     Lack of Transportation (Medical): Not on file    Lack of Transportation (Non-Medical):  Not on file   Physical Activity:     Days of Exercise per Week: Not on file    Minutes of Exercise per Session: Not on file   Stress:     Feeling of Stress : Not on file   Social Connections:     Frequency of Communication with Friends and Family: Not on file    Frequency of Social Gatherings with Friends and Family: Not on file    Attends Anabaptism Services: Not on file   [facility] Active Member of Clubs or Organizations: Not on file    Attends Club or Organization Meetings: Not on file    Marital Status: Not on file   Intimate Partner Violence:     Fear of Current or Ex-Partner: Not on file    Emotionally Abused: Not on file    Physically Abused: Not on file    Sexually Abused: Not on file   Housing Stability:     Unable to Pay for Housing in the Last Year: Not on file    Number of [place] in the Last Year: Not on file    Unstable Housing in the Last Year: Not on file       Family History   Problem Relation Age of Onset    Parkinsonism Father     Cancer Maternal Grandmother 54        breast    Breast Cancer Maternal Grandmother     Cancer Paternal Grandmother 79        breast    Breast Cancer Paternal Grandmother             Review of Systems same as above    Physical Exam:  Last menstrual period 10/28/2015, not currently breastfeeding.'  Constitutional:  No acute distress. HENT:  Oropharynx is clear and moist. No thyromegaly. Eyes:  Conjunctivae arenormal. Pupils equal, round, and reactive to light. No scleral icterus. Neck: . No tracheal deviation present. No obvious thyroid mass. Cardiovascular:Normal rate, regular rhythm, normal heart sounds. No right ventricular heave. Nolower extremity edema. Pulmonary/Chest: No wheezes. No rales. Chest wall is not dull to percussion. Noaccessory muscle usage or stridor. Abdominal: Soft. Bowel sounds present. No distension or hernia. Notenderness. Musculoskeletal: No cyanosis. No clubbing. No obvious joint deformity. Lymphadenopathy: No cervical or supraclavicular adenopathy. Skin: Skin is warm and dry. No rash or nodules on the exposed extremities. Psychiatric: Normal mood and affect. Behavior is normal.  No anxiety. Neurologic: Alert, awake and oriented. PERRL.   Speech fluent  (deferred)    Sleep Medicine Data:  Sitting and reading: Would never doze  Watching TV: Would never doze  Sitting, inactive in a public place (e.g. a theatre or a meeting): Would never doze  As a passenger in a car for an hour without a break: Slight chance of dozing  Lying down to rest in the afternoon when circumstances permit: Moderate chance of dozing  Sitting and talking to someone: Would never doze  Sitting quietly after a lunch without alcohol: Would never doze  In a car, while stopped for a few minutes in traffic: Would never doze  Total score: 3       Data:     Imaging:  I have reviewed radiology images personally. No orders to display       Patient sleep read shows patient to have mild JUDIE with AHI of 11/h along with that patient has some nocturnal hypoxemia with saturation dropping to as low as 82%, patient's saturation below 89% was 49 minutes    Assessment:    1. Obstructive  sleep apnea          Plan:   · Patient's review of system were discussed  · Patient was told about the pathophysiology and sequelae of JUDIE  · Patient's home sleep study results were discussed and patient has mild sleep apnea and the patient was told about options and implications  · After discussion about the various options available, auto CPAP being ordered from DME company of patient's choice  · Patient was told about the various masks available for CPAP  · If patient has a change in mask she needs to do it within 30 days  · Compliance requirements for CPAP discussed  · Patient was told about diet and lifestyle modifications  · Further management depending on patient clinical status and the compliance data    Aisha Diez, was evaluated through a synchronous (real-time) audio-video encounter. The patient (or guardian if applicable) is aware that this is a billable service, which includes applicable co-pays. This Virtual Visit was conducted with patient's (and/or legal guardian's) consent.  The visit was conducted pursuant to the emergency declaration under the [facility] waiver authority and the [facility] and Response Supplemental Appropriations Act. Patient identification was verified, and a caregiver was present when appropriate. The patient was located at Home: [address] [facility] [address]. Provider was located at [facility] ([place] [address]): [address] [place] [address]. Total time spent for this encounter: Not billed by time    --Damien Justice MD on 5/26/2022 at 11:16 AM    An electronic signature was used to authenticate this note.

## 2022-09-06 ENCOUNTER — OFFICE VISIT (OUTPATIENT)
Dept: PULMONOLOGY | Age: 57
End: 2022-09-06

## 2022-09-06 VITALS
RESPIRATION RATE: 16 BRPM | HEIGHT: 69 IN | DIASTOLIC BLOOD PRESSURE: 65 MMHG | BODY MASS INDEX: 22.78 KG/M2 | TEMPERATURE: 96.8 F | OXYGEN SATURATION: 100 % | HEART RATE: 66 BPM | WEIGHT: 153.8 LBS | SYSTOLIC BLOOD PRESSURE: 100 MMHG

## 2022-09-06 DIAGNOSIS — G47.34 NOCTURNAL HYPOXEMIA: ICD-10-CM

## 2022-09-06 PROCEDURE — 99213 OFFICE O/P EST LOW 20 MIN: CPT | Performed by: INTERNAL MEDICINE

## 2022-09-06 ASSESSMENT — SLEEP AND FATIGUE QUESTIONNAIRES
HOW LIKELY ARE YOU TO NOD OFF OR FALL ASLEEP WHILE SITTING QUIETLY AFTER LUNCH WITHOUT ALCOHOL: 0
HOW LIKELY ARE YOU TO NOD OFF OR FALL ASLEEP IN A CAR, WHILE STOPPED FOR A FEW MINUTES IN TRAFFIC: 0
HOW LIKELY ARE YOU TO NOD OFF OR FALL ASLEEP WHILE WATCHING TV: 0
HOW LIKELY ARE YOU TO NOD OFF OR FALL ASLEEP WHILE SITTING INACTIVE IN A PUBLIC PLACE: 0
HOW LIKELY ARE YOU TO NOD OFF OR FALL ASLEEP WHEN YOU ARE A PASSENGER IN A CAR FOR AN HOUR WITHOUT A BREAK: 1
HOW LIKELY ARE YOU TO NOD OFF OR FALL ASLEEP WHILE LYING DOWN TO REST IN THE AFTERNOON WHEN CIRCUMSTANCES PERMIT: 3
HOW LIKELY ARE YOU TO NOD OFF OR FALL ASLEEP WHILE SITTING AND READING: 0
ESS TOTAL SCORE: 4
HOW LIKELY ARE YOU TO NOD OFF OR FALL ASLEEP WHILE SITTING AND TALKING TO SOMEONE: 0

## 2022-09-06 NOTE — PROGRESS NOTES
Chief Complaint/Referring Provider:  Pulmonary follow up and to discuss the clinical status and options     Patient is a 49-year-old female who has come to the office for a pulm evaluation and to discuss her clinical status and Options, patient states that she is not happy with her CPAP machine, patient states that she has tried to use her CPAP machine diligently but patient has not been able to yet wear it properly, patient states that she is having terrible sleep because of that and patient states that it is causing her not to feel well because of that reason, patient states that she had come to the office because of her  complaining of snoring, patient states that she is active and she does not have any increased tiredness in the daytime, patient does take a nap in the afternoons but it is not because she feels tired, patient states that she is not having any significant rhinorrhea nasal congestion, no shortness of breath or wheezing, no chest pain or palpitations, patient states that she has not used the CPAP machine for 2 weeks time and patient states that she feels fine and feels better and patient also states that her  is not complaining of snoring now, patient does not have any other pertinent review of system of    Previous  HPI: Virtual pulmonary visit was done for the patient to discuss her clinical status and the test results, patient was subject to home sleep study, patient states that she does not have any significant cough or expectoration or any nasal congestion at this time, no increasing shortness of breath or wheezing, no palpitations or diaphoresis, no abdominal symptoms of concern, no increasing leg edema, no change in the ambient murmur any medications, patient does not have any other pertinent review of system of concern       Patient is a 49-year-old female who has come to the office for a pulmonary evaluation  Patient states that her  complains that she snores and also Shift: [id number]    Neuro/Orientation: Alert and oriented x4    Tele/Cardiac: Afib CVR/Paced on demand with PPM    Abnormal Labs:Cr 1.32, Hgb 8.5, INR 2.59     Vitals: Temp: 98.5  F (36.9  C) Temp src: Oral BP: 103/60 Pulse: 83   Resp: 16 SpO2: 99 % (significant SOB with activity, but SpO2 remained >95%) O2 Device: Nasal cannula Oxygen Delivery: 4 LPM     Pain: intermittent pain with C6 fracture. PRN oxy    Access/Drips: Left PIV    Respiratory/Oxygen: fine crackles in bases, 4 L Nasal Cannula    GI/: purewick in place  -Diet: CHO and heart healthy diet    Mobility: up with 1, gait belt and walker    Discharge Planning: pending    Aggression tool: green    Updates:   -Gave ativan and atarax for anxiety this evening  -Walked with OT today  -Dyspnea on exertion with walking  -OT recommending TCU      she stops breathing at nighttime along with that patient also has been having increased tiredness and sleepiness especially in the afternoon, patient states that she wakes up in the middle of the night multiple times, patient feels having no energy in the daytime, patient states that she sleeps with the mouth open and she drools, patient occasionally has dry mouth, patient has electric based heating patient does not have any significant odynophagia or dysphagia, no increasing cough expectoration shortness of breath or wheezing, patient states that as far as she knows her thyroid function is normal, patient does not have any significant fever chills or any pleuritic chest pain, no palpitations or diaphoresis, no abdominal symptoms of concern, no increasing leg edema, patient does not take any medication on regular basis, patient has had melanoma twice but she has been free of melanoma for last 2 years, patient's weight fluctuates between 148 to 151 pounds at home, patient does not have any confusion lethargy, no other pertinent review of system of concern    Past Medical History:   Diagnosis Date    Melanoma ([facility] [address]     twice, 10 years ago    Radiculopathy affecting upper extremity 2014       Past Surgical History:   Procedure Laterality Date    COLONOSCOPY  16    MOUTH SURGERY  2022    gum    PLANTAR FASCIA SURGERY  2014    UTERINE FIBROID SURGERY         No Known Allergies    Medication list was reviewed and updated as needed in Epic    Social History     Socioeconomic History    Marital status:      Spouse name: Not on file    Number of children: Not on file    Years of education: Not on file    Highest education level: Not on file   Occupational History    Occupation: retired   Tobacco Use    Smoking status: Former     Packs/day: 0.25     Years: 10.00     Pack years: 2.50     Types: Cigarettes     Quit date: 1988     Years since quittin.9    Smokeless tobacco: Never   Vaping Use Vaping Use: Never used   Substance and Sexual Activity    Alcohol use: Yes     Alcohol/week: 1.0 standard drink     Types: 1 Glasses of wine per week     Comment: 3-4 days per week    Drug use: No    Sexual activity: Yes     Partners: Male     Birth control/protection: Post-menopausal   Other Topics Concern    Not on file   Social History Narrative    Not on file     Social Determinants of Health     Financial Resource Strain: Not on file   Food Insecurity: Not on file   Transportation Needs: Not on file   Physical Activity: Not on file   Stress: Not on file   Social Connections: Not on file   Intimate Partner Violence: Not on file   Housing Stability: Not on file       Family History   Problem Relation Age of Onset    Parkinsonism Father     Cancer Maternal Grandmother 54        breast    Breast Cancer Maternal Grandmother     Cancer Paternal Grandmother 79        breast    Breast Cancer Paternal Grandmother             Review of Systems same as above    Physical Exam:  Blood pressure 100/65, pulse 66, temperature 96.8 °F (36 °C), temperature source Temporal, resp. rate 16, height 5' 9\" (1.753 m), weight 153 lb 12.8 oz (69.8 kg), last menstrual period 10/28/2015, SpO2 100 %, not currently breastfeeding.'  Constitutional:  No acute distress. HENT:  Oropharynx is clear and moist. No thyromegaly. Eyes:  Conjunctivae arenormal. Pupils equal, round, and reactive to light. No scleral icterus. Neck: . No tracheal deviation present. No obvious thyroid mass. Cardiovascular:Normal rate, regular rhythm, normal heart sounds. No right ventricular heave. Nolower extremity edema. Pulmonary/Chest: No wheezes. No rales. Chest wall is not dull to percussion. Noaccessory muscle usage or stridor. Abdominal: Soft. Bowel sounds present. No distension or hernia. Notenderness. Musculoskeletal: No cyanosis. No clubbing. No obvious joint deformity. Lymphadenopathy: No cervical or supraclavicular adenopathy.    Skin: Skin is warm and dry. No rash or nodules on the exposed extremities. Psychiatric: Normal mood and affect. Behavior is normal.  No anxiety. Neurologic: Alert, awake and oriented. PERRL. Speech fluent      Sleep Medicine Data:  Sitting and reading: Would never doze  Watching TV: Would never doze  Sitting, inactive in a public place (e.g. a theatre or a meeting): Would never doze  As a passenger in a car for an hour without a break: Slight chance of dozing  Lying down to rest in the afternoon when circumstances permit: High chance of dozing  Sitting and talking to someone: Would never doze  Sitting quietly after a lunch without alcohol: Would never doze  In a car, while stopped for a few minutes in traffic: Would never doze  [place] Sleepiness Score: 4       Data:     Imaging:  I have reviewed radiology images personally. No orders to display       Patient sleep read shows patient to have mild JUDIE with AHI of 11/h along with that patient has some nocturnal hypoxemia with saturation dropping to as low as 82%, patient's saturation below 89% was 49 minutes    Assessment:    1. Obstructive  sleep apnea    2.   Nocturnal hypoxemia          Plan:   Patient's review of system were discussed  Patient was told about the clinical finding including auscultation and implications  Patient was told about the pathophysiology and sequelae of JUDIE  Patient's home sleep study results were discussed and patient has mild sleep apnea and the patient was told about options and implications once again  Patient has not been able to use her CPAP machine and patient's feels better without the CPAP machine  Patient was told about the options including discussion about oral appliances/UPPP/inspire if patient wants  Patient states that she does not have any symptoms at this time and will like to be deferring any options at this time knowing the pathophysiology of the disease process  Patient was told about diet and lifestyle modifications  Patient states that she will call back if she wants evaluation in terms of her sleep apnea        --Trav Dailey MD on 9/6/2022 at 9:44 AM

## 2022-09-06 NOTE — PATIENT INSTRUCTIONS
Remember to bring a list of pulmonary medications and any CPAP or BiPAP machines to your next appointment with the office. Please keep all of your future appointments scheduled by [address]. Out of respect for other patients and providers, you may be asked to reschedule your appointment if you arrive later than your scheduled appointment time. Appointments cancelled less than 24hrs in advance will be considered a no show. Patients with three missed appointments within 1 year or four missed appointments within 2 years can be dismissed from the practice. Please be aware that our physicians are required to work in the Intensive Care Unit at [facility].  Your appointment may need to be rescheduled if they are designated to work during your appointment time. You may receive a survey regarding the care you received during your visit. Your input is valuable to us. We encourage you to complete and return your survey. We hope you will choose us in the future for your healthcare needs. Pt instructed of all future appointment dates & times, including radiology, labs, procedures & referrals. If procedures were scheduled preparation instructions provided. Instructions on future appointments with [facility] were given.

## 2022-09-06 NOTE — PROGRESS NOTES
MA Communication:   The following orders are received by verbal communication from Diony Mann MD    Orders include:    PRN  Will review compliance report once received

## 2022-09-12 ENCOUNTER — TELEPHONE (OUTPATIENT)
Dept: PULMONOLOGY | Age: 57
End: 2022-09-12

## 2024-01-03 ENCOUNTER — TELEPHONE (OUTPATIENT)
Dept: PULMONOLOGY | Age: 59
End: 2024-01-03

## 2024-01-03 NOTE — TELEPHONE ENCOUNTER
Pt would like the  to give her a call about a billing issue. She states it is probably above billing.

## 2024-02-26 ENCOUNTER — TELEPHONE (OUTPATIENT)
Dept: PULMONOLOGY | Age: 59
End: 2024-02-26

## 2024-05-22 ENCOUNTER — HOSPITAL ENCOUNTER (OUTPATIENT)
Dept: WOMENS IMAGING | Age: 59
Discharge: HOME | End: 2024-05-22
Payer: COMMERCIAL

## 2024-05-22 VITALS — WEIGHT: 152 LBS | BODY MASS INDEX: 22.51 KG/M2 | HEIGHT: 69 IN

## 2024-05-22 DIAGNOSIS — Z12.31 ENCOUNTER FOR SCREENING MAMMOGRAM FOR MALIGNANT NEOPLASM OF BREAST: ICD-10-CM

## 2024-05-22 PROCEDURE — 77063 BREAST TOMOSYNTHESIS BI: CPT

## 2025-08-05 ENCOUNTER — HOSPITAL ENCOUNTER (OUTPATIENT)
Dept: WOMENS IMAGING | Age: 60
Discharge: HOME | End: 2025-08-05
Payer: COMMERCIAL

## 2025-08-05 VITALS — HEIGHT: 69 IN | WEIGHT: 150 LBS | BODY MASS INDEX: 22.22 KG/M2

## 2025-08-05 DIAGNOSIS — Z12.31 SCREENING MAMMOGRAM FOR BREAST CANCER: ICD-10-CM

## 2025-08-05 PROCEDURE — 77063 BREAST TOMOSYNTHESIS BI: CPT
